# Patient Record
Sex: MALE | Race: AMERICAN INDIAN OR ALASKA NATIVE
[De-identification: names, ages, dates, MRNs, and addresses within clinical notes are randomized per-mention and may not be internally consistent; named-entity substitution may affect disease eponyms.]

---

## 2018-09-16 ENCOUNTER — HOSPITAL ENCOUNTER (INPATIENT)
Dept: HOSPITAL 5 - ED | Age: 53
LOS: 1 days | Discharge: HOME | DRG: 312 | End: 2018-09-17
Attending: INTERNAL MEDICINE | Admitting: INTERNAL MEDICINE
Payer: COMMERCIAL

## 2018-09-16 DIAGNOSIS — Z71.6: ICD-10-CM

## 2018-09-16 DIAGNOSIS — Z71.51: ICD-10-CM
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
## 2018-09-16 DIAGNOSIS — R55: Primary | ICD-10-CM

## 2018-09-16 DIAGNOSIS — F12.90: ICD-10-CM

## 2018-09-16 DIAGNOSIS — S00.03XA: ICD-10-CM

## 2018-09-16 DIAGNOSIS — I10: ICD-10-CM
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
## 2018-09-16 DIAGNOSIS — F17.200: ICD-10-CM

## 2018-09-16 DIAGNOSIS — I44.30: ICD-10-CM

## 2018-09-16 DIAGNOSIS — R00.2: ICD-10-CM
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
## 2018-09-16 DIAGNOSIS — F10.10: ICD-10-CM

## 2018-09-16 DIAGNOSIS — I49.3: ICD-10-CM

## 2018-09-16 LAB
ALBUMIN SERPL-MCNC: 4.5 G/DL (ref 3.9–5)
ALT SERPL-CCNC: 22 UNITS/L (ref 7–56)
BAND NEUTROPHILS # (MANUAL): 0 K/MM3
BENZODIAZEPINES SCREEN,URINE: (no result)
BILIRUB UR QL STRIP: (no result)
BLOOD UR QL VISUAL: (no result)
BUN SERPL-MCNC: 15 MG/DL (ref 9–20)
BUN/CREAT SERPL: 17 %
CALCIUM SERPL-MCNC: 9.1 MG/DL (ref 8.4–10.2)
HCT VFR BLD CALC: 38.8 % (ref 35.5–45.6)
HEMOLYSIS INDEX: 14
HGB BLD-MCNC: 13.5 GM/DL (ref 11.8–15.2)
MCH RBC QN AUTO: 31 PG (ref 28–32)
MCHC RBC AUTO-ENTMCNC: 35 % (ref 32–34)
MCV RBC AUTO: 88 FL (ref 84–94)
METHADONE SCREEN,URINE: (no result)
MUCOUS THREADS #/AREA URNS HPF: (no result) /HPF
MYELOCYTES # (MANUAL): 0 K/MM3
OPIATE SCREEN,URINE: (no result)
PH UR STRIP: 6 [PH] (ref 5–7)
PLATELET # BLD: 262 K/MM3 (ref 140–440)
PROMYELOCYTES # (MANUAL): 0 K/MM3
PROT UR STRIP-MCNC: (no result) MG/DL
RBC # BLD AUTO: 4.39 M/MM3 (ref 3.65–5.03)
RBC #/AREA URNS HPF: 1 /HPF (ref 0–6)
TOTAL CELLS COUNTED BLD: 100
UROBILINOGEN UR-MCNC: < 2 MG/DL (ref ?–2)
WBC #/AREA URNS HPF: < 1 /HPF (ref 0–6)

## 2018-09-16 PROCEDURE — 70450 CT HEAD/BRAIN W/O DYE: CPT

## 2018-09-16 PROCEDURE — 82553 CREATINE MB FRACTION: CPT

## 2018-09-16 PROCEDURE — 36415 COLL VENOUS BLD VENIPUNCTURE: CPT

## 2018-09-16 PROCEDURE — 93880 EXTRACRANIAL BILAT STUDY: CPT

## 2018-09-16 PROCEDURE — 82550 ASSAY OF CK (CPK): CPT

## 2018-09-16 PROCEDURE — 93010 ELECTROCARDIOGRAM REPORT: CPT

## 2018-09-16 PROCEDURE — 80307 DRUG TEST PRSMV CHEM ANLYZR: CPT

## 2018-09-16 PROCEDURE — 81001 URINALYSIS AUTO W/SCOPE: CPT

## 2018-09-16 PROCEDURE — 80320 DRUG SCREEN QUANTALCOHOLS: CPT

## 2018-09-16 PROCEDURE — 93306 TTE W/DOPPLER COMPLETE: CPT

## 2018-09-16 PROCEDURE — G0480 DRUG TEST DEF 1-7 CLASSES: HCPCS

## 2018-09-16 PROCEDURE — 93005 ELECTROCARDIOGRAM TRACING: CPT

## 2018-09-16 PROCEDURE — 84443 ASSAY THYROID STIM HORMONE: CPT

## 2018-09-16 PROCEDURE — 84484 ASSAY OF TROPONIN QUANT: CPT

## 2018-09-16 PROCEDURE — 96374 THER/PROPH/DIAG INJ IV PUSH: CPT

## 2018-09-16 PROCEDURE — 80053 COMPREHEN METABOLIC PANEL: CPT

## 2018-09-16 PROCEDURE — 82962 GLUCOSE BLOOD TEST: CPT

## 2018-09-16 PROCEDURE — 96361 HYDRATE IV INFUSION ADD-ON: CPT

## 2018-09-16 PROCEDURE — 85025 COMPLETE CBC W/AUTO DIFF WBC: CPT

## 2018-09-16 PROCEDURE — 85007 BL SMEAR W/DIFF WBC COUNT: CPT

## 2018-09-16 NOTE — CAT SCAN REPORT
FINAL REPORT



EXAM:  CT HEAD/BRAIN WO CON



HISTORY:  fall hit head with LOC 



TECHNIQUE:  Routine imaging was obtained of the brain without IV

contrast.



FINDINGS:  

Those left frontal temporal scalp hematoma. There is no skull

fracture. Intracranially there is no evidence of hemorrhage or

infarct. The ventricular system is appropriate in size and is

symmetric. The basal cisterns appear normal. The visualized

sinuses are clear. The mastoid air cells are well pneumatized.



IMPRESSION:  

Left frontal temporal scalp hematoma without skull fracture.



No evidence of intracranial injury or stroke.

## 2018-09-16 NOTE — EVENT NOTE
Date: 09/16/18


Patient seen and examined medical records reviewed


Vital signs stable





Admitted with  history of syncope


Workup so far negative


CT head without contrast; left frontotemporal scalp hematoma without skull 

fracture


No evidence of intracranial injury or stroke


Carotid Doppler; less than 50% stenosis


Echocardiogram; 55% ejection fraction, no shunt noted





Ambulate as tolerated, fall precautions


Physical therapy 


Smoking cessation counseling


Counseling and advised repeat fixation drug use





Closely monitor, DC home tomorrow if stable

## 2018-09-16 NOTE — EMERGENCY DEPARTMENT REPORT
ED Syncope HPI





- General


Chief Complaint: Fall


Stated Complaint: SYNCOPE


Time Seen by Provider: 09/16/18 01:56


Source: patient


Exam Limitations: no limitations





- History of Present Illness


Initial Comments: 





53-year-old male with a past medical history hypertension presents to the 

hospital complaining of syncopal episode.  Patient was at an advanced, felt hot

, went outside to get some air, then passed out.  He woke up with a left scalp 

hematoma and moderate headache which persists now.  He denies any preceding 

symptoms other than feeling hot and lightheaded.  He denied preceding headache, 

chest pain, shortness of breath, palpitations, or abdominal pain.  There were 

no reports of seizure activity or urinary incontinence.  Patient states he has 

been drinking less fluids but has had good by mouth intake.  He denies melena, 

hematochezia, or hematemesis.  Early in the week patient had intermittent 

palpitations and fluttering of his heart.





- Related Data


Allergies/Adverse Reactions: 


Allergies





No Known Allergies Allergy (Unverified 09/16/18 00:55)


 











ED Review of Systems


ROS: 


Stated complaint: SYNCOPE


Other details as noted in HPI





Comment: All other systems reviewed and negative





ED Past Medical Hx





- Past Medical History


Previous Medical History?: Yes


Hx Hypertension: Yes





- Surgical History


Past Surgical History?: No





- Social History


Smoking Status: Current Every Day Smoker


Substance Use Type: Alcohol, Marijuana





ED Physical Exam





- General


Limitations: Other





- Other


Other exam information: 





General: No limitations, patient is alert in no acute distress


Head exam: Left frontal hematoma


Eyes exam: Normal appearance, pupils equal reactive to light, extraocular 

movements intact


ENT: Moist mucous membrane, normal oropharynx


Neck exam: Normal inspection, full range of motion, no meningismus nontender


Respiratory exam: Clear to auscultation bilateral, no wheezes, rales, crackles


Cardiovascular: Normal rate and rhythm, normal heart sounds


Abdomen: Soft, nondistended, and  nontender, with normal bowel sounds, no 

rebound, or guarding


Extremity: Full range of motion normal inspection no deformity


Back: Normal Inspection, full range of motion, no tenderness


Neurologic: Alert, oriented x3, cranial nerves intact, no motor or sensory 

deficit


Psychiatric: normal affect, normal mood


Skin: Warm, dry, intact





ED Course


 Vital Signs











  09/16/18 09/16/18 09/16/18





  00:51 01:00 02:00


 


Pulse Rate 59 L 60 70


 


Respiratory 11 L 18 23





Rate   


 


Blood Pressure  125/76 126/81


 


O2 Sat by Pulse  99 





Oximetry   














  09/16/18 09/16/18





  03:00 03:04


 


Pulse Rate 63 


 


Respiratory 18 18





Rate  


 


Blood Pressure 126/74 


 


O2 Sat by Pulse 100 





Oximetry  














ED Medical Decision Making





- Lab Data


Result diagrams: 


 09/16/18 01:14





 09/16/18 01:14








 Lab Results











  09/16/18 09/16/18 09/16/18 Range/Units





  01:14 01:14 01:14 


 


WBC  10.1    (4.5-11.0)  K/mm3


 


RBC  4.39    (3.65-5.03)  M/mm3


 


Hgb  13.5    (11.8-15.2)  gm/dl


 


Hct  38.8    (35.5-45.6)  %


 


MCV  88    (84-94)  fl


 


MCH  31    (28-32)  pg


 


MCHC  35 H    (32-34)  %


 


RDW  13.3    (13.2-15.2)  %


 


Plt Count  262    (140-440)  K/mm3


 


Add Manual Diff  Complete    


 


Total Counted  100    


 


Seg Neuts % (Manual)  67.0    (40.0-70.0)  %


 


Band Neutrophils %  0    %


 


Lymphocytes % (Manual)  30.0    (13.4-35.0)  %


 


Reactive Lymphs % (Man)  0    %


 


Monocytes % (Manual)  2.0    (0.0-7.3)  %


 


Eosinophils % (Manual)  1.0    (0.0-4.3)  %


 


Basophils % (Manual)  0    (0.0-1.8)  %


 


Metamyelocytes %  0    %


 


Myelocytes %  0    %


 


Promyelocytes %  0    %


 


Blast Cells %  0    %


 


Nucleated RBC %  Not Reportable    


 


Seg Neutrophils # Man  6.8    (1.8-7.7)  K/mm3


 


Band Neutrophils #  0.0    K/mm3


 


Lymphocytes # (Manual)  3.0    (1.2-5.4)  K/mm3


 


Abs React Lymphs (Man)  0.0    K/mm3


 


Monocytes # (Manual)  0.2    (0.0-0.8)  K/mm3


 


Eosinophils # (Manual)  0.1    (0.0-0.4)  K/mm3


 


Basophils # (Manual)  0.0    (0.0-0.1)  K/mm3


 


Metamyelocytes #  0.0    K/mm3


 


Myelocytes #  0.0    K/mm3


 


Promyelocytes #  0.0    K/mm3


 


Blast Cells #  0.0    K/mm3


 


WBC Morphology  Not Reportable    


 


Hypersegmented Neuts  Not Reportable    


 


Hyposegmented Neuts  Not Reportable    


 


Hypogranular Neuts  Not Reportable    


 


Smudge Cells  Not Reportable    


 


Toxic Granulation  Not Reportable    


 


Toxic Vacuolation  Not Reportable    


 


Dohle Bodies  Not Reportable    


 


Pelger-Huet Anomaly  Not Reportable    


 


Sebastian Rods  Not Reportable    


 


Platelet Estimate  Consistent w auto    


 


Clumped Platelets  Not Reportable    


 


Plt Clumps, EDTA  Not Reportable    


 


Large Platelets  Not Reportable    


 


Giant Platelets  Not Reportable    


 


Platelet Satelliting  Not Reportable    


 


Plt Morphology Comment  Not Reportable    


 


RBC Morphology  Normal    


 


Dimorphic RBCs  Not Reportable    


 


Polychromasia  Not Reportable    


 


Hypochromasia  Not Reportable    


 


Poikilocytosis  Not Reportable    


 


Anisocytosis  Not Reportable    


 


Microcytosis  Not Reportable    


 


Macrocytosis  Not Reportable    


 


Spherocytes  Not Reportable    


 


Pappenheimer Bodies  Not Reportable    


 


Sickle Cells  Not Reportable    


 


Target Cells  Not Reportable    


 


Tear Drop Cells  Not Reportable    


 


Ovalocytes  Not Reportable    


 


Helmet Cells  Not Reportable    


 


Cope-[place] Bodies  Not Reportable    


 


Cabot Rings  Not Reportable    


 


Kyle Cells  Not Reportable    


 


Bite Cells  Not Reportable    


 


Crenated Cell  Not Reportable    


 


Elliptocytes  Not Reportable    


 


Acanthocytes (Spur)  Not Reportable    


 


Rouleaux  Not Reportable    


 


Hemoglobin C Crystals  Not Reportable    


 


Schistocytes  Not Reportable    


 


Malaria parasites  Not Reportable    


 


Jared Bodies  Not Reportable    


 


Hem Pathologist Commnt  No    


 


Sodium   142   (137-145)  mmol/L


 


Potassium   3.7   (3.6-5.0)  mmol/L


 


Chloride   99.5   ()  mmol/L


 


Carbon Dioxide   29   (22-30)  mmol/L


 


Anion Gap   17   mmol/L


 


BUN   15   (9-20)  mg/dL


 


Creatinine   0.9   (0.8-1.5)  mg/dL


 


Estimated GFR   > 60   ml/min


 


BUN/Creatinine Ratio   17   %


 


Glucose   126 H   ()  mg/dL


 


Calcium   9.1   (8.4-10.2)  mg/dL


 


Total Bilirubin   0.60   (0.1-1.2)  mg/dL


 


AST   20   (5-40)  units/L


 


ALT   22   (7-56)  units/L


 


Alkaline Phosphatase   38   ()  units/L


 


Troponin T     (0.00-0.029)  ng/mL


 


Total Protein   7.3   (6.3-8.2)  g/dL


 


Albumin   4.5   (3.9-5)  g/dL


 


Albumin/Globulin Ratio   1.6   %


 


TSH    3.070  (0.270-4.200)  mlU/mL


 


Urine Color     (Yellow)  


 


Urine Turbidity     (Clear)  


 


Urine pH     (5.0-7.0)  


 


Ur Specific Gravity     (1.003-1.030)  


 


Urine Protein     (Negative)  mg/dL


 


Urine Glucose (UA)     (Negative)  mg/dL


 


Urine Ketones     (Negative)  mg/dL


 


Urine Blood     (Negative)  


 


Urine Nitrite     (Negative)  


 


Urine Bilirubin     (Negative)  


 


Urine Urobilinogen     (<2.0)  mg/dL


 


Ur Leukocyte Esterase     (Negative)  


 


Urine WBC (Auto)     (0.0-6.0)  /HPF


 


Urine RBC (Auto)     (0.0-6.0)  /HPF


 


U Epithel Cells (Auto)     (0-13.0)  /HPF


 


Urine Mucus     /HPF


 


Urine Opiates Screen     


 


Urine Methadone Screen     


 


Ur Barbiturates Screen     


 


Ur Phencyclidine Scrn     


 


Ur Amphetamines Screen     


 


U Benzodiazepines Scrn     


 


Urine Cocaine Screen     


 


U Marijuana (THC) Screen     


 


Drugs of Abuse Note     


 


Plasma/Serum Alcohol     (0-0.07)  %














  09/16/18 09/16/18 09/16/18 Range/Units





  01:14 01:14 Unknown 


 


WBC     (4.5-11.0)  K/mm3


 


RBC     (3.65-5.03)  M/mm3


 


Hgb     (11.8-15.2)  gm/dl


 


Hct     (35.5-45.6)  %


 


MCV     (84-94)  fl


 


MCH     (28-32)  pg


 


MCHC     (32-34)  %


 


RDW     (13.2-15.2)  %


 


Plt Count     (140-440)  K/mm3


 


Add Manual Diff     


 


Total Counted     


 


Seg Neuts % (Manual)     (40.0-70.0)  %


 


Band Neutrophils %     %


 


Lymphocytes % (Manual)     (13.4-35.0)  %


 


Reactive Lymphs % (Man)     %


 


Monocytes % (Manual)     (0.0-7.3)  %


 


Eosinophils % (Manual)     (0.0-4.3)  %


 


Basophils % (Manual)     (0.0-1.8)  %


 


Metamyelocytes %     %


 


Myelocytes %     %


 


Promyelocytes %     %


 


Blast Cells %     %


 


Nucleated RBC %     


 


Seg Neutrophils # Man     (1.8-7.7)  K/mm3


 


Band Neutrophils #     K/mm3


 


Lymphocytes # (Manual)     (1.2-5.4)  K/mm3


 


Abs React Lymphs (Man)     K/mm3


 


Monocytes # (Manual)     (0.0-0.8)  K/mm3


 


Eosinophils # (Manual)     (0.0-0.4)  K/mm3


 


Basophils # (Manual)     (0.0-0.1)  K/mm3


 


Metamyelocytes #     K/mm3


 


Myelocytes #     K/mm3


 


Promyelocytes #     K/mm3


 


Blast Cells #     K/mm3


 


WBC Morphology     


 


Hypersegmented Neuts     


 


Hyposegmented Neuts     


 


Hypogranular Neuts     


 


Smudge Cells     


 


Toxic Granulation     


 


Toxic Vacuolation     


 


Dohle Bodies     


 


Pelger-Huet Anomaly     


 


Sebastian Rods     


 


Platelet Estimate     


 


Clumped Platelets     


 


Plt Clumps, EDTA     


 


Large Platelets     


 


Giant Platelets     


 


Platelet Satelliting     


 


Plt Morphology Comment     


 


RBC Morphology     


 


Dimorphic RBCs     


 


Polychromasia     


 


Hypochromasia     


 


Poikilocytosis     


 


Anisocytosis     


 


Microcytosis     


 


Macrocytosis     


 


Spherocytes     


 


Pappenheimer Bodies     


 


Sickle Cells     


 


Target Cells     


 


Tear Drop Cells     


 


Ovalocytes     


 


Helmet Cells     


 


Cope-[place] Bodies     


 


Cabot Rings     


 


Spencer Cells     


 


Bite Cells     


 


Crenated Cell     


 


Elliptocytes     


 


Acanthocytes (Spur)     


 


Rouleaux     


 


Hemoglobin C Crystals     


 


Schistocytes     


 


Malaria parasites     


 


Jared Bodies     


 


Hem Pathologist Commnt     


 


Sodium     (137-145)  mmol/L


 


Potassium     (3.6-5.0)  mmol/L


 


Chloride     ()  mmol/L


 


Carbon Dioxide     (22-30)  mmol/L


 


Anion Gap     mmol/L


 


BUN     (9-20)  mg/dL


 


Creatinine     (0.8-1.5)  mg/dL


 


Estimated GFR     ml/min


 


BUN/Creatinine Ratio     %


 


Glucose     ()  mg/dL


 


Calcium     (8.4-10.2)  mg/dL


 


Total Bilirubin     (0.1-1.2)  mg/dL


 


AST     (5-40)  units/L


 


ALT     (7-56)  units/L


 


Alkaline Phosphatase     ()  units/L


 


Troponin T   < 0.010   (0.00-0.029)  ng/mL


 


Total Protein     (6.3-8.2)  g/dL


 


Albumin     (3.9-5)  g/dL


 


Albumin/Globulin Ratio     %


 


TSH     (0.270-4.200)  mlU/mL


 


Urine Color    Yellow  (Yellow)  


 


Urine Turbidity    Clear  (Clear)  


 


Urine pH    6.0  (5.0-7.0)  


 


Ur Specific Gravity    1.012  (1.003-1.030)  


 


Urine Protein    <15 mg/dl  (Negative)  mg/dL


 


Urine Glucose (UA)    Neg  (Negative)  mg/dL


 


Urine Ketones    Neg  (Negative)  mg/dL


 


Urine Blood    Neg  (Negative)  


 


Urine Nitrite    Neg  (Negative)  


 


Urine Bilirubin    Neg  (Negative)  


 


Urine Urobilinogen    < 2.0  (<2.0)  mg/dL


 


Ur Leukocyte Esterase    Neg  (Negative)  


 


Urine WBC (Auto)    < 1.0  (0.0-6.0)  /HPF


 


Urine RBC (Auto)    1.0  (0.0-6.0)  /HPF


 


U Epithel Cells (Auto)    < 1.0  (0-13.0)  /HPF


 


Urine Mucus    Few  /HPF


 


Urine Opiates Screen     


 


Urine Methadone Screen     


 


Ur Barbiturates Screen     


 


Ur Phencyclidine Scrn     


 


Ur Amphetamines Screen     


 


U Benzodiazepines Scrn     


 


Urine Cocaine Screen     


 


U Marijuana (THC) Screen     


 


Drugs of Abuse Note     


 


Plasma/Serum Alcohol  0.01    (0-0.07)  %














  09/16/18 Range/Units





  Unknown 


 


WBC   (4.5-11.0)  K/mm3


 


RBC   (3.65-5.03)  M/mm3


 


Hgb   (11.8-15.2)  gm/dl


 


Hct   (35.5-45.6)  %


 


MCV   (84-94)  fl


 


MCH   (28-32)  pg


 


MCHC   (32-34)  %


 


RDW   (13.2-15.2)  %


 


Plt Count   (140-440)  K/mm3


 


Add Manual Diff   


 


Total Counted   


 


Seg Neuts % (Manual)   (40.0-70.0)  %


 


Band Neutrophils %   %


 


Lymphocytes % (Manual)   (13.4-35.0)  %


 


Reactive Lymphs % (Man)   %


 


Monocytes % (Manual)   (0.0-7.3)  %


 


Eosinophils % (Manual)   (0.0-4.3)  %


 


Basophils % (Manual)   (0.0-1.8)  %


 


Metamyelocytes %   %


 


Myelocytes %   %


 


Promyelocytes %   %


 


Blast Cells %   %


 


Nucleated RBC %   


 


Seg Neutrophils # Man   (1.8-7.7)  K/mm3


 


Band Neutrophils #   K/mm3


 


Lymphocytes # (Manual)   (1.2-5.4)  K/mm3


 


Abs React Lymphs (Man)   K/mm3


 


Monocytes # (Manual)   (0.0-0.8)  K/mm3


 


Eosinophils # (Manual)   (0.0-0.4)  K/mm3


 


Basophils # (Manual)   (0.0-0.1)  K/mm3


 


Metamyelocytes #   K/mm3


 


Myelocytes #   K/mm3


 


Promyelocytes #   K/mm3


 


Blast Cells #   K/mm3


 


WBC Morphology   


 


Hypersegmented Neuts   


 


Hyposegmented Neuts   


 


Hypogranular Neuts   


 


Smudge Cells   


 


Toxic Granulation   


 


Toxic Vacuolation   


 


Dohle Bodies   


 


Pelger-Huet Anomaly   


 


Sebastian Rods   


 


Platelet Estimate   


 


Clumped Platelets   


 


Plt Clumps, EDTA   


 


Large Platelets   


 


Giant Platelets   


 


Platelet Satelliting   


 


Plt Morphology Comment   


 


RBC Morphology   


 


Dimorphic RBCs   


 


Polychromasia   


 


Hypochromasia   


 


Poikilocytosis   


 


Anisocytosis   


 


Microcytosis   


 


Macrocytosis   


 


Spherocytes   


 


Pappenheimer Bodies   


 


Sickle Cells   


 


Target Cells   


 


Tear Drop Cells   


 


Ovalocytes   


 


Helmet Cells   


 


Cope-[place] Bodies   


 


Cabot Rings   


 


Spencer Cells   


 


Bite Cells   


 


Crenated Cell   


 


Elliptocytes   


 


Acanthocytes (Spur)   


 


Rouleaux   


 


Hemoglobin C Crystals   


 


Schistocytes   


 


Malaria parasites   


 


Jared Bodies   


 


Hem Pathologist Commnt   


 


Sodium   (137-145)  mmol/L


 


Potassium   (3.6-5.0)  mmol/L


 


Chloride   ()  mmol/L


 


Carbon Dioxide   (22-30)  mmol/L


 


Anion Gap   mmol/L


 


BUN   (9-20)  mg/dL


 


Creatinine   (0.8-1.5)  mg/dL


 


Estimated GFR   ml/min


 


BUN/Creatinine Ratio   %


 


Glucose   ()  mg/dL


 


Calcium   (8.4-10.2)  mg/dL


 


Total Bilirubin   (0.1-1.2)  mg/dL


 


AST   (5-40)  units/L


 


ALT   (7-56)  units/L


 


Alkaline Phosphatase   ()  units/L


 


Troponin T   (0.00-0.029)  ng/mL


 


Total Protein   (6.3-8.2)  g/dL


 


Albumin   (3.9-5)  g/dL


 


Albumin/Globulin Ratio   %


 


TSH   (0.270-4.200)  mlU/mL


 


Urine Color   (Yellow)  


 


Urine Turbidity   (Clear)  


 


Urine pH   (5.0-7.0)  


 


Ur Specific Gravity   (1.003-1.030)  


 


Urine Protein   (Negative)  mg/dL


 


Urine Glucose (UA)   (Negative)  mg/dL


 


Urine Ketones   (Negative)  mg/dL


 


Urine Blood   (Negative)  


 


Urine Nitrite   (Negative)  


 


Urine Bilirubin   (Negative)  


 


Urine Urobilinogen   (<2.0)  mg/dL


 


Ur Leukocyte Esterase   (Negative)  


 


Urine WBC (Auto)   (0.0-6.0)  /HPF


 


Urine RBC (Auto)   (0.0-6.0)  /HPF


 


U Epithel Cells (Auto)   (0-13.0)  /HPF


 


Urine Mucus   /HPF


 


Urine Opiates Screen  Presumptive negative  


 


Urine Methadone Screen  Presumptive negative  


 


Ur Barbiturates Screen  Presumptive negative  


 


Ur Phencyclidine Scrn  Presumptive negative  


 


Ur Amphetamines Screen  Presumptive negative  


 


U Benzodiazepines Scrn  Presumptive negative  


 


Urine Cocaine Screen  Presumptive negative  


 


U Marijuana (THC) Screen  Presumptive positive  


 


Drugs of Abuse Note  Disclamer  


 


Plasma/Serum Alcohol   (0-0.07)  %














- EKG Data


-: EKG Interpreted by Me


EKG shows normal: sinus rhythm, axis (qrs 23), QRS complexes (qrsd 78), ST-T 

waves (lat and inf t inv)


Rate: normal





- EKG Data


When compared to previous EKG there are: previous EKG unavailable





- Radiology Data


Radiology results: report reviewed





FINAL REPORT 





 EXAM: CT HEAD/BRAIN WO CON 





 HISTORY: fall hit head with LOC 





 TECHNIQUE: Routine imaging was obtained of the brain without IV 


 contrast. 





 FINDINGS: 


 Those left frontal temporal scalp hematoma. There is no skull 


 fracture. Intracranially there is no evidence of hemorrhage or 


 infarct. The ventricular system is appropriate in size and is 


 symmetric. The basal cisterns appear normal. The visualized 


 sinuses are clear. The mastoid air cells are well pneumatized. 





 IMPRESSION: 


 Left frontal temporal scalp hematoma without skull fracture. 





 No evidence of intracranial injury or stroke. 








- Medical Decision Making





Patient had a syncopal episode reports intermittent palpitations.  Will be 

admitted for further monitoring and workup.  Hospitalist informed for 

permission.





- Differential Diagnosis


vasovagal, arrhythmia, dehydration, anemia


Critical Care Time: No


Critical care attestation.: 


If time is entered above; I have spent that time in minutes in the direct care 

of this critically ill patient, excluding procedure time.








ED Disposition


Clinical Impression: 


 Syncope, Intermittent palpitations, HTN (hypertension)





Disposition: DC-09 OP ADMIT IP TO THIS HOSP


Is pt being admited?: Yes


Condition: Stable


Instructions:  Hypertension (ED)


Time of Disposition: 04:13 (Dr Warner/hosp)

## 2018-09-16 NOTE — HISTORY AND PHYSICAL REPORT
CHIEF COMPLAINT:  Syncopal attack.



HISTORY OF PRESENT ILLNESS:  The patient is a 53-year-old who said he had some

palpitations some days prior to passing out today.  The patient and spouse said

they went for a party and in the party, the patient started feeling hot and then

fell face down and had some swelling in the forehead area.  He denied any 
history of

dizziness prior to fainting and denied any history of chest pain, shortness of

breath, nausea or vomiting.  The patient presented for evaluation.



PAST MEDICAL HISTORY:  Pertinent for hypertension.



PAST SURGICAL HISTORY:  Unremarkable.



FAMILY HISTORY:  Noncontributory.



SOCIAL HISTORY:  The patient drinks alcohol and admitted to having some alcohol

prior to syncopal episode.  The patient smokes cigarettes and uses marijuana in

the past.



MEDICATIONS:  The patient's home medications are not known.



ALLERGIES:  There are no known drug allergies.



REVIEW OF SYSTEMS:

CONSTITUTIONAL:  No fever, no chills, no diaphoresis.

HEENT:  There is no headache or sore throat.

CARDIOVASCULAR:  There is no chest pain, no orthopnea.

RESPIRATORY:  There is no shortness of breath or cough.

GASTROINTESTINAL:  There is no nausea, no vomiting, no abdominal pain, diarrhea

or constipation.

NEUROLOGICAL:  Syncopal episodes noted, feeling of hot sensation noted.

MUSCULOSKELETAL SYSTEM:  There is no joint pain or swelling.

DERMATOLOGICAL:  There is no skin rash or itching.

GENITOURINARY:  There is no dysuria, hematuria, or flank pain.  Rest of system

review is normal.



PHYSICAL EXAMINATION:

GENERAL:  At the time of exam, the patient was found to be alert, oriented x 3,

not in the acute distress.

VITAL SIGNS:  Initially at the time presentation shows normal temperature with

pulse of 69, respirations 11, blood pressure 125/76, O2 sat of 99% on room air.

HEENT:  Shows swelling in the forehead from where the patient fell to the floor.

 Pupils are equal, round, reactive to light and accommodation.  Extraocular

muscles are intact.

NECK:  Supple with no JVD or carotid bruit.

CARDIOVASCULAR:  Showed normal first and second heart sounds with no gallops,

rubs or murmurs.

RESPIRATORY SYSTEM:  Show good air entry on both sides of the lung with no

abnormal breath sounds.

GASTROINTESTINAL SYSTEM:  Show abdomen to be full, soft, nontender with no

organomegaly or rigidity.

NEUROLOGIC:  Shows no focal deficit.

MUSCULOSKELETAL:  Show no joint swelling or tenderness.

DERMATOLOGICAL SYSTEM:  Show no skin rash.

GENITOURINARY:  Showing no costovertebral angle tenderness.



PERTINENT LABORATORY AND IMAGING STUDIES:  The patient had a CT of the head

without contrast done that shows left frontotemporal scalp hematoma without

skull fracture.  There is no evidence of intracranial injury or stroke,

according to radiologist.  The patient's lab results shows normal CBC and

unremarkable chemistry and unremarkable urinalysis .  Toxicology screen is

positive for marijuana.



DIAGNOSES:

1.  Syncope.

2.  Palpitation.

3.  Hypertension.

4.  Marijuana use.



PLAN:

1.  The patient will be admitted to medical floor on telemetry. 

2.  The patient will have complete echocardiogram done this morning and will

also have bilateral carotids Dopplers done. 

3.  The patient will have cardiac enzymes involving troponin, total CK and CK-MB

checked q. 6 hours x 2 more levels.

4.  The patient will be on IV normal saline at 125 mL an hour and will also be

on p.r.n. medications, like Tylenol 650 mg by mouth every 4 hours for fever and

headache and Zofran 4 mg IV every 8 hours for nausea and vomiting. 

5.  The patient's home medications will be started when they are known.





DD: 09/16/2018 08:45

DT: 09/16/2018 12:23

JOB# [id number]  [id number]

OCN/NTS

[id number]

## 2018-09-17 VITALS — SYSTOLIC BLOOD PRESSURE: 155 MMHG | DIASTOLIC BLOOD PRESSURE: 102 MMHG

## 2018-09-17 NOTE — PROGRESS NOTE
Assessment and Plan


Assessment and plan: 


--Syncope; no new episodes of syncope since admission


Fall precautions





Workup so far negative


CT head without contrast; left frontotemporal scalp hematoma without skull 

fracture


No evidence of intracranial injury or stroke


Carotid Doppler; less than 50% stenosis


Echocardiogram; 55% ejection fraction, no shunt noted


However patient's telemetry strips show PVCs and possible AV block 





Consult cardiology for further evaluation





--s/p Fall/supraorbital hematoma/contusion


Supportive care





--DVT prophylaxis; Lovenox





Closely monitor patient and adjust the management as needed


Follow cardiology evaluation and recommendations


Possible Discharge in 1-2 days if stable





History


Interval history: 


Patient seen and examined medical records reviewed,


Admitted with syncope, and supraorbital scalp abrasion


 Syncope workup so far is negative , however


Telemetry report second-degree AV block


patient feels better no new episodes of syncope or dizziness


Vital signs reviewed











Hospitalist Physical





- Constitutional


Vitals: 


 











Temp Pulse Resp BP Pulse Ox


 


 98.1 F   79   18   137/89   100 


 


 09/17/18 07:49  09/17/18 07:50  09/17/18 07:49  09/17/18 07:49  09/17/18 07:49











General appearance: Present: no acute distress, well-nourished





- EENT


Eyes: Present: PERRL, EOM intact





- Neck


Neck: Present: supple, normal ROM





- Respiratory


Respiratory effort: normal


Respiratory: bilateral: diminished, negative: rales, rhonchi, wheezing





- Cardiovascular


Rhythm: regular


Heart Sounds: Present: S1 & S2





- Extremities


Extremities: no ischemia, No edema





- Abdominal


General gastrointestinal: soft, non-tender, non-distended, normal bowel sounds





- Integumentary


Integumentary: Present: clear, warm





- Psychiatric


Psychiatric: appropriate mood/affect, cooperative





- Neurologic


Neurologic: CNII-XII intact, moves all extremities





Results





- Labs


CBC & Chem 7: 


 09/16/18 01:14





 09/16/18 01:14


Labs: 


 Laboratory Last Values











WBC  10.1 K/mm3 (4.5-11.0)   09/16/18  01:14    


 


RBC  4.39 M/mm3 (3.65-5.03)   09/16/18  01:14    


 


Hgb  13.5 gm/dl (11.8-15.2)   09/16/18  01:14    


 


Hct  38.8 % (35.5-45.6)   09/16/18  01:14    


 


MCV  88 fl (84-94)   09/16/18  01:14    


 


MCH  31 pg (28-32)   09/16/18  01:14    


 


MCHC  35 % (32-34)  H  09/16/18  01:14    


 


RDW  13.3 % (13.2-15.2)   09/16/18  01:14    


 


Plt Count  262 K/mm3 (140-440)   09/16/18  01:14    


 


Add Manual Diff  Complete   09/16/18  01:14    


 


Total Counted  100   09/16/18  01:14    


 


Seg Neuts % (Manual)  67.0 % (40.0-70.0)   09/16/18  01:14    


 


Band Neutrophils %  0 %  09/16/18  01:14    


 


Lymphocytes % (Manual)  30.0 % (13.4-35.0)   09/16/18  01:14    


 


Reactive Lymphs % (Man)  0 %  09/16/18  01:14    


 


Monocytes % (Manual)  2.0 % (0.0-7.3)   09/16/18  01:14    


 


Eosinophils % (Manual)  1.0 % (0.0-4.3)   09/16/18  01:14    


 


Basophils % (Manual)  0 % (0.0-1.8)   09/16/18  01:14    


 


Metamyelocytes %  0 %  09/16/18  01:14    


 


Myelocytes %  0 %  09/16/18  01:14    


 


Promyelocytes %  0 %  09/16/18  01:14    


 


Blast Cells %  0 %  09/16/18  01:14    


 


Nucleated RBC %  Not Reportable   09/16/18  01:14    


 


Seg Neutrophils # Man  6.8 K/mm3 (1.8-7.7)   09/16/18  01:14    


 


Band Neutrophils #  0.0 K/mm3  09/16/18  01:14    


 


Lymphocytes # (Manual)  3.0 K/mm3 (1.2-5.4)   09/16/18  01:14    


 


Abs React Lymphs (Man)  0.0 K/mm3  09/16/18  01:14    


 


Monocytes # (Manual)  0.2 K/mm3 (0.0-0.8)   09/16/18  01:14    


 


Eosinophils # (Manual)  0.1 K/mm3 (0.0-0.4)   09/16/18  01:14    


 


Basophils # (Manual)  0.0 K/mm3 (0.0-0.1)   09/16/18  01:14    


 


Metamyelocytes #  0.0 K/mm3  09/16/18  01:14    


 


Myelocytes #  0.0 K/mm3  09/16/18  01:14    


 


Promyelocytes #  0.0 K/mm3  09/16/18  01:14    


 


Blast Cells #  0.0 K/mm3  09/16/18  01:14    


 


WBC Morphology  Not Reportable   09/16/18  01:14    


 


Hypersegmented Neuts  Not Reportable   09/16/18  01:14    


 


Hyposegmented Neuts  Not Reportable   09/16/18  01:14    


 


Hypogranular Neuts  Not Reportable   09/16/18  01:14    


 


Smudge Cells  Not Reportable   09/16/18  01:14    


 


Toxic Granulation  Not Reportable   09/16/18  01:14    


 


Toxic Vacuolation  Not Reportable   09/16/18  01:14    


 


Dohle Bodies  Not Reportable   09/16/18  01:14    


 


Pelger-Huet Anomaly  Not Reportable   09/16/18  01:14    


 


Sebastian Rods  Not Reportable   09/16/18  01:14    


 


Platelet Estimate  Consistent w auto   09/16/18  01:14    


 


Clumped Platelets  Not Reportable   09/16/18  01:14    


 


Plt Clumps, EDTA  Not Reportable   09/16/18  01:14    


 


Large Platelets  Not Reportable   09/16/18  01:14    


 


Giant Platelets  Not Reportable   09/16/18  01:14    


 


Platelet Satelliting  Not Reportable   09/16/18  01:14    


 


Plt Morphology Comment  Not Reportable   09/16/18  01:14    


 


RBC Morphology  Normal   09/16/18  01:14    


 


Dimorphic RBCs  Not Reportable   09/16/18  01:14    


 


Polychromasia  Not Reportable   09/16/18  01:14    


 


Hypochromasia  Not Reportable   09/16/18  01:14    


 


Poikilocytosis  Not Reportable   09/16/18  01:14    


 


Anisocytosis  Not Reportable   09/16/18  01:14    


 


Microcytosis  Not Reportable   09/16/18  01:14    


 


Macrocytosis  Not Reportable   09/16/18  01:14    


 


Spherocytes  Not Reportable   09/16/18  01:14    


 


Pappenheimer Bodies  Not Reportable   09/16/18  01:14    


 


Sickle Cells  Not Reportable   09/16/18  01:14    


 


Target Cells  Not Reportable   09/16/18  01:14    


 


Tear Drop Cells  Not Reportable   09/16/18  01:14    


 


Ovalocytes  Not Reportable   09/16/18  01:14    


 


Helmet Cells  Not Reportable   09/16/18  01:14    


 


Cope-Isanti Bodies  Not Reportable   09/16/18  01:14    


 


Cabot Rings  Not Reportable   09/16/18  01:14    


 


[place] Cells  Not Reportable   09/16/18  01:14    


 


Bite Cells  Not Reportable   09/16/18  01:14    


 


Crenated Cell  Not Reportable   09/16/18  01:14    


 


Elliptocytes  Not Reportable   09/16/18  01:14    


 


Acanthocytes (Spur)  Not Reportable   09/16/18  01:14    


 


Rouleaux  Not Reportable   09/16/18  01:14    


 


Hemoglobin C Crystals  Not Reportable   09/16/18  01:14    


 


Schistocytes  Not Reportable   09/16/18  01:14    


 


Malaria parasites  Not Reportable   09/16/18  01:14    


 


Jared Bodies  Not Reportable   09/16/18  01:14    


 


Hem Pathologist Commnt  No   09/16/18  01:14    


 


Sodium  142 mmol/L (137-145)   09/16/18  01:14    


 


Potassium  3.7 mmol/L (3.6-5.0)   09/16/18  01:14    


 


Chloride  99.5 mmol/L ()   09/16/18  01:14    


 


Carbon Dioxide  29 mmol/L (22-30)   09/16/18  01:14    


 


Anion Gap  17 mmol/L  09/16/18  01:14    


 


BUN  15 mg/dL (9-20)   09/16/18  01:14    


 


Creatinine  0.9 mg/dL (0.8-1.5)   09/16/18  01:14    


 


Estimated GFR  > 60 ml/min  09/16/18  01:14    


 


BUN/Creatinine Ratio  17 %  09/16/18  01:14    


 


Glucose  126 mg/dL ()  H  09/16/18  01:14    


 


POC Glucose  105  ()   09/16/18  13:32    


 


Calcium  9.1 mg/dL (8.4-10.2)   09/16/18  01:14    


 


Total Bilirubin  0.60 mg/dL (0.1-1.2)   09/16/18  01:14    


 


AST  20 units/L (5-40)   09/16/18  01:14    


 


ALT  22 units/L (7-56)   09/16/18  01:14    


 


Alkaline Phosphatase  38 units/L ()   09/16/18  01:14    


 


Total Creatine Kinase  227 units/L ()  H  09/16/18  17:44    


 


CK-MB (CK-2)  2.5 ng/mL (0.0-4.0)   09/16/18  17:44    


 


CK-MB (CK-2) Rel Index  1.1  (0-4)   09/16/18  17:44    


 


Troponin T  < 0.010 ng/mL (0.00-0.029)   09/16/18  17:44    


 


Total Protein  7.3 g/dL (6.3-8.2)   09/16/18  01:14    


 


Albumin  4.5 g/dL (3.9-5)   09/16/18  01:14    


 


Albumin/Globulin Ratio  1.6 %  09/16/18  01:14    


 


TSH  3.070 mlU/mL (0.270-4.200)   09/16/18  01:14    


 


Urine Color  Yellow  (Yellow)   09/16/18  Unknown


 


Urine Turbidity  Clear  (Clear)   09/16/18  Unknown


 


Urine pH  6.0  (5.0-7.0)   09/16/18  Unknown


 


Ur Specific Gravity  1.012  (1.003-1.030)   09/16/18  Unknown


 


Urine Protein  <15 mg/dl mg/dL (Negative)   09/16/18  Unknown


 


Urine Glucose (UA)  Neg mg/dL (Negative)   09/16/18  Unknown


 


Urine Ketones  Neg mg/dL (Negative)   09/16/18  Unknown


 


Urine Blood  Neg  (Negative)   09/16/18  Unknown


 


Urine Nitrite  Neg  (Negative)   09/16/18  Unknown


 


Urine Bilirubin  Neg  (Negative)   09/16/18  Unknown


 


Urine Urobilinogen  < 2.0 mg/dL (<2.0)   09/16/18  Unknown


 


Ur Leukocyte Esterase  Neg  (Negative)   09/16/18  Unknown


 


Urine WBC (Auto)  < 1.0 /HPF (0.0-6.0)   09/16/18  Unknown


 


Urine RBC (Auto)  1.0 /HPF (0.0-6.0)   09/16/18  Unknown


 


U Epithel Cells (Auto)  < 1.0 /HPF (0-13.0)   09/16/18  Unknown


 


Urine Mucus  Few /HPF  09/16/18  Unknown


 


Urine Opiates Screen  Presumptive negative   09/16/18  Unknown


 


Urine Methadone Screen  Presumptive negative   09/16/18  Unknown


 


Ur Barbiturates Screen  Presumptive negative   09/16/18  Unknown


 


Ur Phencyclidine Scrn  Presumptive negative   09/16/18  Unknown


 


Ur Amphetamines Screen  Presumptive negative   09/16/18  Unknown


 


U Benzodiazepines Scrn  Presumptive negative   09/16/18  Unknown


 


Urine Cocaine Screen  Presumptive negative   09/16/18  Unknown


 


U Marijuana (THC) Screen  Presumptive positive   09/16/18  Unknown


 


Drugs of Abuse Note  Disclamer   09/16/18  Unknown


 


Plasma/Serum Alcohol  0.01 % (0-0.07)   09/16/18  01:14

## 2018-09-17 NOTE — EVENT NOTE
Date: 09/17/18


Detailed cardiology consultation dictated. 





A:


#1: Syncope - suspect vasovagal


#2: Blocked PACs


#2: HTN


#3: ETOH use / marijuana use 





P:


Telemetry reviewed - pt appears to have infrequent blocked PACs, no AV block 

noted. Echo reviewed - no significant abnormalities noted. ECG with NAF, Sohail 

negative for AMI. Pt denies any chest pain, SOB or palpitations. Suspect 

syncopal episode was vasovagal in etiology. Can consider OP stress test and/or 

holter study if symptoms reoccur. Pt may discharge home from cardiology 

standpoint. Recommend follow up in our office with Dr. Ospina within 2 weeks of 

hospital discharge ([phone]). 





LETTY OREILLY NP / DR. OSPINA

## 2018-09-17 NOTE — DISCHARGE SUMMARY
Providers





- Providers


Date of Admission: 


09/16/18 09:12





Date of discharge: 09/17/18


Attending physician: 


AMY J KOCHERLA





Primary care physician: 


PRIMARY CARE MD








Hospitalization


Condition: Stable





Core Measure Documentation





- Palliative Care


Palliative Care/ Comfort Measures: Not Applicable





Exam





- Constitutional


Vitals: 


 











Temp Pulse Resp BP Pulse Ox


 


 98.1 F   52 L  18   137/89   100 


 


 09/17/18 07:49  09/17/18 07:49  09/17/18 07:49  09/17/18 07:49  09/17/18 07:49














Plan


Activity: advance as tolerated, fall precautions


Additional Instructions: Fall precautions.  See private neurologist for further 

evaluation as needed


Follow up with: 


PRIMARY CARE,MD [Primary Care Provider] - 7 Days


NY MELENDEZ MD [Staff Physician] - 7 Days

## 2018-09-17 NOTE — DISCHARGE SUMMARY
Providers





- Providers


Date of Admission: 


09/16/18 09:12





Date of discharge: 09/17/18


Attending physician: 


AMY J KOCHERLA





 





09/17/18 11:27


Consult to Physician [CONS] Routine 


   Comment: 


   Consulting Provider: GEETA OSPINA


   Physician Instructions: 


   Reason For Exam: Syncope/2nd degree AV block











Primary care physician: 


PRIMARY CARE MD








Hospitalization


Reason for admission: syncopal episode/scalp hematoma


Condition: Stable


Pertinent studies: 


CT head without contrast; left frontotemporal scalp hematoma without skull 

fracture


No evidence of intracranial injury or stroke


Carotid Doppler; less than 50% stenosis


Echocardiogram; 55% ejection fraction, no shunt noted





Hospital course: 


Very pleasant 53-year-old -American male patient with significant 

history of hypertension, intermittent palpitations,


 is admitted through emergency room with a history of syncopal episode, patient 

sustained a small hematoma left forehead area.


She was admitted observed fall precautions, had extensive syncope workup


CT head without contrast, echocardiogram, carotid Doppler which were negative.


However patient had intermittent cardiac ischemia possible PVCs versus  AV 

block.


Patient gives history of irregular heart rate for many years.


Cardiology has evaluated in consultation, no intervention or special management 

this time, 


advised to follow with them in the office for further evaluation as needed


Today patient is comfortable in no new complaints


Vital signs reviewed, clinical examination is unremarkable


Patient is hemodynamically and clinically stable at discharge


Advised to quit marijuana use





Discharge diagnosis:


--Syncope; no new episodes of syncope , workup negative


--Possible AV block/PVCs, in the setting of syncope


  Cardiology evaluated, advised to follow for further evaluation as outpatient


--s/p Fall/scalp hematoma, Supportive care


--History of marijuana use; strongly advised to quit








Disposition: DC-01 TO HOME OR SELFCARE


Time spent for discharge: 31 min





Core Measure Documentation





- Palliative Care


Palliative Care/ Comfort Measures: Not Applicable





- Core Measures


Any of the following diagnoses?: none





Exam





- Constitutional


Vitals: 


 











Temp Pulse Resp BP Pulse Ox


 


 98.1 F   63   20   155/102   97 


 


 09/17/18 11:19  09/17/18 11:19  09/17/18 11:19  09/17/18 11:19  09/17/18 11:19











General appearance: Present: no acute distress, well-nourished





- EENT


Eyes: Present: PERRL, EOM intact





- Neck


Neck: Present: supple, normal ROM





- Respiratory


Respiratory effort: normal


Respiratory: bilateral: diminished, negative: rales, rhonchi, wheezing





- Cardiovascular


Rhythm: regular


Heart Sounds: Present: S1 & S2





- Extremities


Extremities: no ischemia, No edema


Peripheral Pulses: within normal limits





- Abdominal


General gastrointestinal: Present: soft, non-tender, non-distended, normal 

bowel sounds





- Integumentary


Integumentary: Present: clear, warm





- Musculoskeletal


Musculoskeletal: strength equal bilaterally





- Psychiatric


Psychiatric: appropriate mood/affect, cooperative





- Neurologic


Neurologic: CNII-XII intact, moves all extremities





Plan


Activity: no restrictions, fall precautions


Diet: regular


Additional Instructions: Fall precautions.  Continue your home medications as 

before.  If you have recurrent syncope, you may need neurology evaluation


Follow up with: 


NY MELENDEZ MD [Staff Physician] - 7 Days


PRIMARY CARE,MD [Primary Care Provider] - 7 Days


GEETA OSPINA MD [Staff Physician] - 7 Days
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
